# Patient Record
Sex: MALE | Race: WHITE
[De-identification: names, ages, dates, MRNs, and addresses within clinical notes are randomized per-mention and may not be internally consistent; named-entity substitution may affect disease eponyms.]

---

## 2018-02-12 ENCOUNTER — HOSPITAL ENCOUNTER (OUTPATIENT)
Dept: HOSPITAL 45 - C.LABBC | Age: 68
Discharge: HOME | End: 2018-02-12
Attending: NURSE PRACTITIONER
Payer: COMMERCIAL

## 2018-02-12 DIAGNOSIS — E78.5: Primary | ICD-10-CM

## 2018-02-12 DIAGNOSIS — I10: ICD-10-CM

## 2018-02-12 LAB
ALBUMIN SERPL-MCNC: 3.6 GM/DL (ref 3.4–5)
ALP SERPL-CCNC: 70 U/L (ref 45–117)
ALT SERPL-CCNC: 34 U/L (ref 12–78)
AST SERPL-CCNC: 19 U/L (ref 15–37)
BUN SERPL-MCNC: 14 MG/DL (ref 7–18)
CALCIUM SERPL-MCNC: 8.9 MG/DL (ref 8.5–10.1)
CO2 SERPL-SCNC: 27 MMOL/L (ref 21–32)
CREAT SERPL-MCNC: 1.04 MG/DL (ref 0.6–1.4)
GLUCOSE SERPL-MCNC: 121 MG/DL (ref 70–99)
KETONES UR QL STRIP: 125 MG/DL
PH UR: 192 MG/DL (ref 0–200)
POTASSIUM SERPL-SCNC: 4.2 MMOL/L (ref 3.5–5.1)
PROT SERPL-MCNC: 7.7 GM/DL (ref 6.4–8.2)
SODIUM SERPL-SCNC: 137 MMOL/L (ref 136–145)

## 2018-03-01 ENCOUNTER — HOSPITAL ENCOUNTER (OUTPATIENT)
Dept: HOSPITAL 45 - C.LABBC | Age: 68
Discharge: HOME | End: 2018-03-01
Attending: NURSE PRACTITIONER
Payer: COMMERCIAL

## 2018-03-01 DIAGNOSIS — E78.5: Primary | ICD-10-CM

## 2018-03-01 DIAGNOSIS — R73.03: ICD-10-CM

## 2018-03-01 DIAGNOSIS — I10: ICD-10-CM

## 2018-03-01 LAB
BUN SERPL-MCNC: 18 MG/DL (ref 7–18)
CALCIUM SERPL-MCNC: 9.4 MG/DL (ref 8.5–10.1)
CO2 SERPL-SCNC: 27 MMOL/L (ref 21–32)
CREAT SERPL-MCNC: 1.12 MG/DL (ref 0.6–1.4)
GLUCOSE SERPL-MCNC: 116 MG/DL (ref 70–99)
HBA1C MFR BLD: 6.2 % (ref 4.5–5.6)
POTASSIUM SERPL-SCNC: 4.4 MMOL/L (ref 3.5–5.1)
SODIUM SERPL-SCNC: 137 MMOL/L (ref 136–145)

## 2018-03-12 ENCOUNTER — HOSPITAL ENCOUNTER (OUTPATIENT)
Dept: HOSPITAL 45 - C.ULTR | Age: 68
Discharge: HOME | End: 2018-03-12
Attending: NURSE PRACTITIONER
Payer: COMMERCIAL

## 2018-03-12 DIAGNOSIS — M79.604: Primary | ICD-10-CM

## 2018-03-12 NOTE — DIAGNOSTIC IMAGING REPORT
R VENOUS DOPP LOWER EXT UNILAT



HISTORY:  67 years-old Male M79.604 Pain of right lower extremityright lower

[id number] acute pain and swelling of the right lower extremity



COMPARISON: None available



TECHNIQUE: Multiple real-time sonographic images of the right lower extremity

deep venous structures were obtained assessing grayscale appearance, color and

spectral flow.



FINDINGS: 

There is normal flow, phasicity, compressibility and augmentation of the right

lower extremity deep venous structures.



IMPRESSION: No sonographic evidence of deep venous thrombosis. 







The above report was generated using voice recognition software. It may contain

grammatical, syntax or spelling errors.







Electronically signed by:  Gaudencio Naranjo M.D.

3/12/2018 4:05 PM



Dictated Date/Time:  3/12/2018 4:04 PM